# Patient Record
Sex: FEMALE | Race: OTHER | HISPANIC OR LATINO
[De-identification: names, ages, dates, MRNs, and addresses within clinical notes are randomized per-mention and may not be internally consistent; named-entity substitution may affect disease eponyms.]

---

## 2022-03-17 PROBLEM — Z00.00 ENCOUNTER FOR PREVENTIVE HEALTH EXAMINATION: Status: ACTIVE | Noted: 2022-03-17

## 2022-03-18 ENCOUNTER — APPOINTMENT (OUTPATIENT)
Dept: ANTEPARTUM | Facility: CLINIC | Age: 32
End: 2022-03-18
Payer: COMMERCIAL

## 2022-03-18 ENCOUNTER — ASOB RESULT (OUTPATIENT)
Age: 32
End: 2022-03-18

## 2022-03-18 ENCOUNTER — APPOINTMENT (OUTPATIENT)
Dept: MATERNAL FETAL MEDICINE | Facility: CLINIC | Age: 32
End: 2022-03-18
Payer: COMMERCIAL

## 2022-03-18 VITALS
WEIGHT: 116 LBS | HEIGHT: 62 IN | RESPIRATION RATE: 16 BRPM | DIASTOLIC BLOOD PRESSURE: 78 MMHG | SYSTOLIC BLOOD PRESSURE: 134 MMHG | HEART RATE: 96 BPM | OXYGEN SATURATION: 98 % | BODY MASS INDEX: 21.35 KG/M2

## 2022-03-18 DIAGNOSIS — Z87.42 PERSONAL HISTORY OF OTHER DISEASES OF THE FEMALE GENITAL TRACT: ICD-10-CM

## 2022-03-18 DIAGNOSIS — Z3A.13 13 WEEKS GESTATION OF PREGNANCY: ICD-10-CM

## 2022-03-18 PROCEDURE — 36416 COLLJ CAPILLARY BLOOD SPEC: CPT

## 2022-03-18 PROCEDURE — 99205 OFFICE O/P NEW HI 60 MIN: CPT

## 2022-03-18 PROCEDURE — 76813 OB US NUCHAL MEAS 1 GEST: CPT

## 2022-03-18 RX ORDER — ASPIRIN 81 MG/1
81 TABLET, CHEWABLE ORAL DAILY
Qty: 90 | Refills: 3 | Status: ACTIVE | COMMUNITY
Start: 2022-03-18 | End: 1900-01-01

## 2022-03-18 RX ORDER — VITAMIN C, CALCIUM, IRON, VITAMIN D3, VITAMIN E, VITAMIN B1, VITAMIN B2, VITAMIN B3, VITAMIN B6, FOLIC ACID, IODINE, ZINC, COPPER, DOCUSATE SODIUM, DOCOSAHEXAENOIC ACID (DHA) 27-1-50 MG
KIT ORAL
Refills: 0 | Status: ACTIVE | COMMUNITY

## 2022-03-18 RX ORDER — LAMOTRIGINE 25 MG/1
25 TABLET ORAL
Refills: 0 | Status: ACTIVE | COMMUNITY

## 2022-03-18 NOTE — FAMILY HISTORY
[Age 35+ During Pregnancy] : not 35 or over during pregnancy [Reported Family History Of Birth Defects] : no congenital heart defects [Loco-Sachs Carrier] : no Loco-Sachs [Family History] : no mental retardation/autism [Reported Family History Of Genetic Disease] : no history of child defect in child of baby father

## 2022-03-18 NOTE — VITALS
[LMP (date): ___] : LMP was on [unfilled] [GA =___ Weeks] : which calculates to a GA of [unfilled] weeks [GA= ___ Days] : and [unfilled] day(s) [EMILY by LMP (date): ___] : The calculated EMIYL by LMP is [unfilled] [By LMP] : this is the final EMILY

## 2022-03-27 PROBLEM — Z3A.13 13 WEEKS GESTATION OF PREGNANCY: Status: ACTIVE | Noted: 2022-03-18

## 2022-03-27 NOTE — OB HISTORY
[LMP: ___] : LMP: [unfilled] [EMILY: ___] : EMILY: [unfilled] [EGA: ___ wks] : EGA: [unfilled] wks [Spontaneous] : Spontaneous conception [Definite:  ___ (Date)] : the last menstrual period was [unfilled] [Normal Amount/Duration] : was of a normal amount and duration [Regular Cycle Intervals] : periods have been regular [FreeTextEntry1] : History obtained by  using [employer] , ID # [id number]\par \par Patient referred to our office for [facility] to discuss history of epilepsy during pregnancy. Patient states she was diagnosed in 2010 by her PCP after having multiple seizures. Patient states she was put on Lamictal 25 mg BID to control the seizures. Last seizure was 2 years ago. Patient states she does not see a specialist. Patient reports good fetal movement, denies vaginal bleeding, leakage of fluid and cramping/ contractions.  [Spotting Between  Menses] : no spotting between menses

## 2022-03-27 NOTE — HISTORY OF PRESENT ILLNESS
[FreeTextEntry1] : Nicolle Adames is a 31 y.o.  with LMP of 21 and EMILY of 22 who presents at 13w3d for  consultation secondary to pregnancy complicated by seizure disorder.  Her BMI is 21 kg/m2.  She was diagnosed with a seizure disorder in  after experiencing episodes of fainting and generalized seizure disorder. She was started on Lamictal 50 mg BID but is currently taking 25 mg BID.  She had a follow up EEG in [facility] on 22 and no seizure activity was detected (patient provided report for review).  On the day of consultation, Katia feels well and reports no obstetrical or constitutional complaint. History obtained and counseling performed with the assistance of [employer]  #[id number].

## 2022-03-27 NOTE — REVIEW OF SYSTEMS
[Fever] : no fever [Sight Problems] : no sight problems [Chest Pain] : no chest pain [Dyspnea] : no shortness of breath [Abdominal Pain] : no abdominal pain [Vomiting] : no vomiting [Pelvic Pain] : no pelvic pain [Abn Vag Bleeding] : no abnormal vaginal bleeding [Joint Swelling] : no joint swelling [Breast Pain] : no breast pain [Convulsions] : no convulsions [Headache] : no headache [Anxiety] : no anxiety [Depression] : no depression

## 2022-03-27 NOTE — DISCUSSION/SUMMARY
[FreeTextEntry1] : Nicolle reports a history of seizure disorder with no reported seizure activity for the past two years.  She is currently on Lamictal 25 mg BID.  We reviewed the available safety information regarding Lamictal therapy in pregnancy.  An increase in oral clefts was suspected after Lamictal (lamotrigine) exposure during pregnancy based on results from one pregnancy registry but not confirmed in other registries or in a large record-linkage study.  There is no indication to increase folic acid supplementation over the standard recommendation of 400 mcg daily.  The clearance of Lamictal increases during pregnancy and the dose may need to be adjusted with increasing gestational age.  Consider evaluation of total and free plasma drug levels each trimester or at the discretion of neurology.  Recommendations for fetal testing include serial evaluation of interval growth.\par \par We reviewed strategies to decrease triggers for seizure activity, including the importance of adequate rest, sleep and compliance with drug therapy.  This is important not only during the pregnancy, but in the postpartum period.  After delivery, precautions should be taken to protect her infant if Nicolle has a seizure.  For example, having another person present when the she bathes the child should be considered. In addition, the baby should be changed on the floor or an alternative safe position.  All of the antiseizure drugs are measurable in breast milk.  Lamictal is present in human milk at concentrations less than or similar to maternal blood concentrations.  There is no evidence to determine whether this form of antiseizure drug exposure has clinical effects on the . Most experts believe that taking antiseizure drugs does not generally contraindicate breast feeding, as probable benefits outweigh risks. The patient and her partner voiced understanding of the above recommendations and all questions were answered.

## 2022-03-27 NOTE — DATA REVIEWED
[FreeTextEntry1] : 3/18/22 - viable rutledge with CRL measuring consistent with dates.  NT 21. mm.  Fetal profile and NLC appear sonographically normal.

## 2022-04-06 ENCOUNTER — APPOINTMENT (OUTPATIENT)
Dept: ANTEPARTUM | Facility: CLINIC | Age: 32
End: 2022-04-06
Payer: COMMERCIAL

## 2022-04-06 DIAGNOSIS — O35.1XX0 MATERNAL CARE FOR (SUSPECTED) CHROMOSOMAL ABNORMALITY IN FETUS, NOT APPLICABLE OR UNSPECIFIED: ICD-10-CM

## 2022-04-06 PROCEDURE — 36415 COLL VENOUS BLD VENIPUNCTURE: CPT

## 2022-04-08 LAB
1ST TRIMESTER DATA: NORMAL
ADDENDUM DOC: NORMAL
AFP PNL SERPL: NORMAL
AFP SERPL-ACNC: NORMAL
CLINICAL BIOCHEMIST REVIEW: NORMAL
FREE BETA HCG 1ST TRIMESTER: NORMAL
Lab: NORMAL
NASAL BONE: PRESENT
NOTES NTD: NORMAL
NT: NORMAL
PAPP-A SERPL-ACNC: NORMAL
TRISOMY 18/3: NORMAL

## 2022-04-26 LAB
1ST TRIMESTER DATA: NORMAL
2ND TRIMESTER DATA: NORMAL
AFP PNL SERPL: NORMAL
AFP SERPL-ACNC: NORMAL
AFP SERPL-ACNC: NORMAL
B-HCG FREE SERPL-MCNC: NORMAL
CLINICAL BIOCHEMIST REVIEW: NORMAL
FREE BETA HCG 1ST TRIMESTER: NORMAL
INHIBIN A SERPL-MCNC: NORMAL
NASAL BONE: PRESENT
NOTES NTD: NORMAL
NT: NORMAL
PAPP-A SERPL-ACNC: NORMAL
U ESTRIOL SERPL-SCNC: NORMAL

## 2022-05-03 ENCOUNTER — ASOB RESULT (OUTPATIENT)
Age: 32
End: 2022-05-03

## 2022-05-03 ENCOUNTER — APPOINTMENT (OUTPATIENT)
Dept: ANTEPARTUM | Facility: CLINIC | Age: 32
End: 2022-05-03
Payer: COMMERCIAL

## 2022-05-03 PROCEDURE — 76811 OB US DETAILED SNGL FETUS: CPT

## 2022-05-03 PROCEDURE — 76817 TRANSVAGINAL US OBSTETRIC: CPT

## 2022-05-12 ENCOUNTER — APPOINTMENT (OUTPATIENT)
Dept: PEDIATRIC CARDIOLOGY | Facility: CLINIC | Age: 32
End: 2022-05-12
Payer: COMMERCIAL

## 2022-05-12 DIAGNOSIS — O35.5XX0 MATERNAL CARE FOR (SUSPECTED) DAMAGE TO FETUS BY DRUGS, NOT APPLICABLE OR UNSPECIFIED: ICD-10-CM

## 2022-05-12 DIAGNOSIS — O99.351 DISEASES OF THE NERVOUS SYSTEM COMPLICATING PREGNANCY, FIRST TRIMESTER: ICD-10-CM

## 2022-05-12 DIAGNOSIS — G40.909 DISEASES OF THE NERVOUS SYSTEM COMPLICATING PREGNANCY, FIRST TRIMESTER: ICD-10-CM

## 2022-05-12 PROCEDURE — 99203 OFFICE O/P NEW LOW 30 MIN: CPT

## 2022-05-12 PROCEDURE — 76820 UMBILICAL ARTERY ECHO: CPT

## 2022-05-12 PROCEDURE — 76821 MIDDLE CEREBRAL ARTERY ECHO: CPT

## 2022-05-12 PROCEDURE — 93325 DOPPLER ECHO COLOR FLOW MAPG: CPT | Mod: 59

## 2022-05-12 PROCEDURE — 76827 ECHO EXAM OF FETAL HEART: CPT

## 2022-05-12 PROCEDURE — 76825 ECHO EXAM OF FETAL HEART: CPT

## 2022-06-28 ENCOUNTER — APPOINTMENT (OUTPATIENT)
Dept: ANTEPARTUM | Facility: CLINIC | Age: 32
End: 2022-06-28

## 2022-08-10 ENCOUNTER — APPOINTMENT (OUTPATIENT)
Dept: ANTEPARTUM | Facility: CLINIC | Age: 32
End: 2022-08-10

## 2022-08-10 ENCOUNTER — ASOB RESULT (OUTPATIENT)
Age: 32
End: 2022-08-10

## 2022-08-10 ENCOUNTER — APPOINTMENT (OUTPATIENT)
Age: 32
End: 2022-08-10

## 2022-08-10 PROCEDURE — 76820 UMBILICAL ARTERY ECHO: CPT

## 2022-08-10 PROCEDURE — ZZZZZ: CPT

## 2022-08-10 PROCEDURE — 76818 FETAL BIOPHYS PROFILE W/NST: CPT

## 2022-08-10 PROCEDURE — 76821 MIDDLE CEREBRAL ARTERY ECHO: CPT

## 2022-08-10 PROCEDURE — 93976 VASCULAR STUDY: CPT

## 2022-08-10 PROCEDURE — 76816 OB US FOLLOW-UP PER FETUS: CPT

## 2022-08-16 ENCOUNTER — APPOINTMENT (OUTPATIENT)
Dept: ANTEPARTUM | Facility: CLINIC | Age: 32
End: 2022-08-16

## 2022-08-16 ENCOUNTER — ASOB RESULT (OUTPATIENT)
Age: 32
End: 2022-08-16

## 2022-08-16 PROCEDURE — ZZZZZ: CPT

## 2022-08-16 PROCEDURE — 76818 FETAL BIOPHYS PROFILE W/NST: CPT

## 2022-08-16 PROCEDURE — 76820 UMBILICAL ARTERY ECHO: CPT

## 2022-08-23 ENCOUNTER — APPOINTMENT (OUTPATIENT)
Dept: ANTEPARTUM | Facility: CLINIC | Age: 32
End: 2022-08-23

## 2022-08-23 ENCOUNTER — ASOB RESULT (OUTPATIENT)
Age: 32
End: 2022-08-23

## 2022-08-23 ENCOUNTER — NON-APPOINTMENT (OUTPATIENT)
Age: 32
End: 2022-08-23

## 2022-08-23 PROCEDURE — 76818 FETAL BIOPHYS PROFILE W/NST: CPT

## 2022-08-23 PROCEDURE — ZZZZZ: CPT

## 2022-08-26 ENCOUNTER — ASOB RESULT (OUTPATIENT)
Age: 32
End: 2022-08-26

## 2022-08-26 ENCOUNTER — APPOINTMENT (OUTPATIENT)
Dept: ANTEPARTUM | Facility: CLINIC | Age: 32
End: 2022-08-26

## 2022-08-26 PROCEDURE — ZZZZZ: CPT

## 2022-08-26 PROCEDURE — 76821 MIDDLE CEREBRAL ARTERY ECHO: CPT

## 2022-08-26 PROCEDURE — 76816 OB US FOLLOW-UP PER FETUS: CPT

## 2022-08-26 PROCEDURE — 93976 VASCULAR STUDY: CPT

## 2022-08-26 PROCEDURE — 76820 UMBILICAL ARTERY ECHO: CPT

## 2022-08-26 PROCEDURE — 76818 FETAL BIOPHYS PROFILE W/NST: CPT

## 2022-08-30 ENCOUNTER — APPOINTMENT (OUTPATIENT)
Dept: ANTEPARTUM | Facility: CLINIC | Age: 32
End: 2022-08-30

## 2022-08-30 RX ORDER — OXYTOCIN 10 UNIT/ML
333.33 VIAL (ML) INJECTION
Qty: 20 | Refills: 0 | Status: COMPLETED | OUTPATIENT
Start: 2022-08-31 | End: 2022-08-31

## 2022-08-30 RX ORDER — LAMOTRIGINE 25 MG/1
25 TABLET, ORALLY DISINTEGRATING ORAL
Refills: 0 | Status: DISCONTINUED | OUTPATIENT
Start: 2022-08-31 | End: 2022-09-03

## 2022-08-30 RX ORDER — SODIUM CHLORIDE 9 MG/ML
1000 INJECTION, SOLUTION INTRAVENOUS
Refills: 0 | Status: DISCONTINUED | OUTPATIENT
Start: 2022-08-31 | End: 2022-09-01

## 2022-08-30 RX ORDER — CHLORHEXIDINE GLUCONATE 213 G/1000ML
1 SOLUTION TOPICAL ONCE
Refills: 0 | Status: DISCONTINUED | OUTPATIENT
Start: 2022-08-31 | End: 2022-09-01

## 2022-08-30 NOTE — OB PROVIDER H&P - NSHPPHYSICALEXAM_GEN_ALL_CORE
Gen: NAD, well-appearing, AAOx3   Abd: Soft, gravid  Ext: non-tender, non-edematous  SSE:   SVE:  1/40/-3  Bedside sono: vertex, posterior placenta  FHT: baseline 130, moderate variability, +accels, -decels Gen: NAD, well-appearing, AAOx3   Abd: Soft, gravid  Ext: non-tender, non-edematous  SVE:  1/40/-3  Bedside sono: vertex, posterior placenta  FHT: baseline 130, moderate variability, +accels, -decels

## 2022-08-30 NOTE — OB PROVIDER H&P - PRO RUBELLA INFANT
Detail Level: Detailed Quality 137: Melanoma: Continuity Of Care - Recall System: Patient information entered into a recall system that includes: target date for the next exam specified AND a process to follow up with patients regarding missed or unscheduled appointments immune

## 2022-08-30 NOTE — OB PROVIDER H&P - ASSESSMENT
David Nicolle Adames is a 32 year old  at 37w1d by LMP who presents to L&D for IOL secondary to FGR EFW 2%ile, AC <1%ile, elevated left uterine PI with notching.   -Admit to L&D  -Consent  -Admission labs  -IV fluids  -Labor: Intact/*ROM. Latent/Active labor. Teresa *.   -Fetus: Cat I tracing. Continuous toco and fetal monitoring.   -GBS: Unknown. Culture ordered  -Analgesia:    David pugaZacarias Nicolle is a 32 year old  at 37w1d by LMP who presents to L&D for IOL secondary to FGR EFW 2%ile, AC <1%ile, elevated left uterine PI with notching.   -Admit to L&D  -Consent  -Admission labs  -IV fluids  -Labor: Intact. Teresa ~q8mins.  -Fetus: Cat I tracing. Continuous toco and fetal monitoring.   -GBS: Unknown. Culture ordered  -Analgesia   Nicolle Larkin is a 32 year old  at 37w1d by LMP who is admitted to L&D for IOL secondary to FGR EFW 2%ile, AC <1%ile, elevated left uterine PI with notching.   -Admit to L&D  -Consent  -Admission labs  -IV fluids  -Labor: Intact. Teresa ~q8mins.  -Fetus: Cat I tracing. Continuous toco and fetal monitoring.   -GBS: Neg  -Analgesia: prn     Will discuss with Dr. Gutierrez

## 2022-08-30 NOTE — OB PROVIDER H&P - PRETERM DELIVERIES, OB PROFILE
Pt refusing to be seen and wheeled herself out of triage during report from EMS.  
Writer attempted to ask patient to stay and being seen, left out triage entrance and does not want to be seen  
0

## 2022-08-30 NOTE — OB PROVIDER H&P - HISTORY OF PRESENT ILLNESS
David Zacarias, Nicolle is a 32 year old  at 37w1d by LMP who presents to L&D for IOL secondary to FGR EFW 2%ile, AC <1%ile, elevated left uterine PI with notching.         EMILY: 22   LMP: 21      Pregnancy course:   FGR EFW 2%ile, AC <1%ile with abnormal Dopplers: elevated left uterine PI with notching   Epilepsy on Lamictal   Marginal cord insertion      Obhx: None   Gynhx: Hx of ovarian cyst. Denies abnormal Paps, STIs   Pmhx: Epilepsy diagnosed in  with last seizure 2 years ago    Pshx: Denies   Meds: Lamicatl 25mg BID, iron, ASA, PNV   Allergies: Acetaminophen   Social Hx: Denies alcohol, tobacco, illicit drug use during pregnancy      BMI: 25.05   Ultrasound: vtx, post   EFW : 2112, 2%ile  David Zacarias, Nicolle is a 32 year old  at 37w1d by LMP who presents to L&D for IOL secondary to FGR EFW 2%ile, AC <1%ile, elevated left uterine PI with notching.         EMILY: 22   LMP: 21      Pregnancy course:   FGR EFW 2%ile, AC <1%ile with abnormal Dopplers: elevated left uterine PI with notching   Epilepsy on Lamictal   Marginal cord insertion      Obhx: None   Gynhx: Hx of ovarian cyst. Denies abnormal Paps, STIs   Pmhx: Epilepsy diagnosed in  with last seizure 2 years ago    Pshx: Denies   Meds: Lamicatl 25mg BID, iron, ASA, PNV   Allergies: Acetaminophen (hives)  Social Hx: Denies alcohol, tobacco, illicit drug use during pregnancy      BMI: 25.05   Ultrasound: vtx, post   EFW : 2112, 2%ile  David Zacarias, Nicolle is a 32 year old  at 37w1d by LMP who presents to L&D for IOL secondary to FGR EFW 2%ile, AC <1%ile, elevated left uterine PI with notching.         EMILY: 22   LMP: 21      Pregnancy course:   FGR EFW 2%ile, AC <1%ile with abnormal Dopplers: elevated left uterine PI with notching   Epilepsy on Lamictal   Marginal cord insertion      Obhx: None   Gynhx: Hx of ovarian cyst. Denies abnormal Paps, STIs   Pmhx: Epilepsy diagnosed in  with last seizure 2 years ago    Pshx: Denies   Meds: Lamicatl 25mg BID, iron, ASA, PNV   Allergies: Acetaminophen (hives)  Social Hx: Denies alcohol, tobacco, illicit drug use during pregnancy      BMI: 25.05   Ultrasound: vtx, post   EFW : 2112, 2%ile      Nicolle Larkin is a 32 year old  at 37w1d by LMP who presents to L&D for IOL secondary to FGR EFW 2%ile, AC <1%ile, elevated left uterine PI with notching. She denies contractions, leakage of fluid, and vaginal bleeding. She states fetal movement. No other complaints at this time.       EMILY: 22   LMP: 21      Pregnancy course:   FGR EFW 2%ile, AC <1%ile with abnormal Dopplers: elevated left uterine PI with notching   Epilepsy on Lamictal   Marginal cord insertion      Obhx: None   Gynhx: Hx of ovarian cyst. Denies abnormal Paps, STIs   Pmhx: Epilepsy diagnosed in  with last seizure 2 years ago    Pshx: Denies   Meds: Lamicatl 25mg BID, iron, ASA, PNV   Allergies: Acetaminophen (hives)  Social Hx: Denies alcohol, tobacco, illicit drug use during pregnancy      BMI: 25.05   Ultrasound: vtx, post   EFW : 2112, 2%ile

## 2022-08-31 ENCOUNTER — INPATIENT (INPATIENT)
Facility: HOSPITAL | Age: 32
LOS: 2 days | Discharge: HOME | End: 2022-09-03
Attending: SPECIALIST | Admitting: SPECIALIST
Payer: COMMERCIAL

## 2022-08-31 VITALS — RESPIRATION RATE: 17 BRPM | TEMPERATURE: 98 F

## 2022-08-31 LAB
ANISOCYTOSIS BLD QL: SLIGHT — SIGNIFICANT CHANGE UP
BASOPHILS # BLD AUTO: 0.1 K/UL — SIGNIFICANT CHANGE UP (ref 0–0.2)
BASOPHILS NFR BLD AUTO: 0.9 % — SIGNIFICANT CHANGE UP (ref 0–2)
BLD GP AB SCN SERPL QL: SIGNIFICANT CHANGE UP
EOSINOPHIL # BLD AUTO: 0 K/UL — SIGNIFICANT CHANGE UP (ref 0–0.5)
EOSINOPHIL NFR BLD AUTO: 0 % — SIGNIFICANT CHANGE UP (ref 0–6)
GIANT PLATELETS BLD QL SMEAR: PRESENT — SIGNIFICANT CHANGE UP
HCT VFR BLD CALC: 41.9 % — SIGNIFICANT CHANGE UP (ref 34.5–45)
HGB BLD-MCNC: 14.1 G/DL — SIGNIFICANT CHANGE UP (ref 11.5–15.5)
LYMPHOCYTES # BLD AUTO: 17.6 % — SIGNIFICANT CHANGE UP (ref 13–44)
LYMPHOCYTES # BLD AUTO: 2 K/UL — SIGNIFICANT CHANGE UP (ref 1–3.3)
MANUAL SMEAR VERIFICATION: SIGNIFICANT CHANGE UP
MCHC RBC-ENTMCNC: 31.5 PG — SIGNIFICANT CHANGE UP (ref 27–34)
MCHC RBC-ENTMCNC: 33.7 GM/DL — SIGNIFICANT CHANGE UP (ref 32–36)
MCV RBC AUTO: 93.5 FL — SIGNIFICANT CHANGE UP (ref 80–100)
MONOCYTES # BLD AUTO: 0.69 K/UL — SIGNIFICANT CHANGE UP (ref 0–0.9)
MONOCYTES NFR BLD AUTO: 6.1 % — SIGNIFICANT CHANGE UP (ref 2–14)
MYELOCYTES NFR BLD: 2.6 % — HIGH (ref 0–0)
NEUTROPHILS # BLD AUTO: 8.28 K/UL — HIGH (ref 1.8–7.4)
NEUTROPHILS NFR BLD AUTO: 72.8 % — SIGNIFICANT CHANGE UP (ref 43–77)
PLAT MORPH BLD: NORMAL — SIGNIFICANT CHANGE UP
PLATELET # BLD AUTO: 155 K/UL — SIGNIFICANT CHANGE UP (ref 150–400)
POLYCHROMASIA BLD QL SMEAR: SIGNIFICANT CHANGE UP
RBC # BLD: 4.48 M/UL — SIGNIFICANT CHANGE UP (ref 3.8–5.2)
RBC # FLD: 13.6 % — SIGNIFICANT CHANGE UP (ref 10.3–14.5)
RBC BLD AUTO: ABNORMAL
SARS-COV-2 RNA SPEC QL NAA+PROBE: SIGNIFICANT CHANGE UP
WBC # BLD: 11.37 K/UL — HIGH (ref 3.8–10.5)
WBC # FLD AUTO: 11.37 K/UL — HIGH (ref 3.8–10.5)

## 2022-08-31 RX ORDER — CITRIC ACID/SODIUM CITRATE 300-500 MG
30 SOLUTION, ORAL ORAL ONCE
Refills: 0 | Status: COMPLETED | OUTPATIENT
Start: 2022-08-31 | End: 2022-09-01

## 2022-08-31 RX ORDER — SODIUM CHLORIDE 9 MG/ML
1000 INJECTION, SOLUTION INTRAVENOUS
Refills: 0 | Status: DISCONTINUED | OUTPATIENT
Start: 2022-08-31 | End: 2022-09-03

## 2022-08-31 RX ADMIN — LAMOTRIGINE 25 MILLIGRAM(S): 25 TABLET, ORALLY DISINTEGRATING ORAL at 17:59

## 2022-08-31 NOTE — OB RN PATIENT PROFILE - PATIENT REPRESENTATIVE: ( YOU CAN CHOOSE ANY PERSON THAT CAN ASSIST YOU WITH YOUR HEALTH CARE PREFERENCES, DOES NOT HAVE TO BE A SPOUSE, IMMEDIATE FAMILY OR SIGNIFICANT OTHER/PARTNER)
Declines no loss of consciousness, no gait abnormality, no headache, no sensory deficits, and no weakness.

## 2022-08-31 NOTE — OB RN PATIENT PROFILE - FALL HARM RISK - UNIVERSAL INTERVENTIONS
Bed in lowest position, wheels locked, appropriate side rails in place/Call bell, personal items and telephone in reach/Instruct patient to call for assistance before getting out of bed or chair/Non-slip footwear when patient is out of bed/[place] to call system/Physically safe environment - no spills, clutter or unnecessary equipment/Purposeful Proactive Rounding/Room/bathroom lighting operational, light cord in reach

## 2022-09-01 ENCOUNTER — RESULT REVIEW (OUTPATIENT)
Age: 32
End: 2022-09-01

## 2022-09-01 LAB
COVID-19 SPIKE DOMAIN AB INTERP: POSITIVE
COVID-19 SPIKE DOMAIN ANTIBODY RESULT: >250 U/ML — HIGH
SARS-COV-2 IGG+IGM SERPL QL IA: >250 U/ML — HIGH
SARS-COV-2 IGG+IGM SERPL QL IA: POSITIVE
T PALLIDUM AB TITR SER: NEGATIVE — SIGNIFICANT CHANGE UP

## 2022-09-01 PROCEDURE — 88307 TISSUE EXAM BY PATHOLOGIST: CPT | Mod: 26

## 2022-09-01 RX ORDER — IBUPROFEN 200 MG
600 TABLET ORAL EVERY 6 HOURS
Refills: 0 | Status: COMPLETED | OUTPATIENT
Start: 2022-09-01 | End: 2023-07-31

## 2022-09-01 RX ORDER — KETOROLAC TROMETHAMINE 30 MG/ML
30 SYRINGE (ML) INJECTION ONCE
Refills: 0 | Status: DISCONTINUED | OUTPATIENT
Start: 2022-09-01 | End: 2022-09-01

## 2022-09-01 RX ORDER — MAGNESIUM HYDROXIDE 400 MG/1
30 TABLET, CHEWABLE ORAL
Refills: 0 | Status: DISCONTINUED | OUTPATIENT
Start: 2022-09-01 | End: 2022-09-03

## 2022-09-01 RX ORDER — DIPHENHYDRAMINE HCL 50 MG
25 CAPSULE ORAL EVERY 6 HOURS
Refills: 0 | Status: DISCONTINUED | OUTPATIENT
Start: 2022-09-01 | End: 2022-09-03

## 2022-09-01 RX ORDER — OXYTOCIN 10 UNIT/ML
VIAL (ML) INJECTION
Qty: 30 | Refills: 0 | Status: DISCONTINUED | OUTPATIENT
Start: 2022-09-01 | End: 2022-09-01

## 2022-09-01 RX ORDER — PRAMOXINE HYDROCHLORIDE 150 MG/15G
1 AEROSOL, FOAM RECTAL EVERY 4 HOURS
Refills: 0 | Status: DISCONTINUED | OUTPATIENT
Start: 2022-09-01 | End: 2022-09-03

## 2022-09-01 RX ORDER — SIMETHICONE 80 MG/1
80 TABLET, CHEWABLE ORAL EVERY 4 HOURS
Refills: 0 | Status: DISCONTINUED | OUTPATIENT
Start: 2022-09-01 | End: 2022-09-03

## 2022-09-01 RX ORDER — LANOLIN
1 OINTMENT (GRAM) TOPICAL EVERY 6 HOURS
Refills: 0 | Status: DISCONTINUED | OUTPATIENT
Start: 2022-09-01 | End: 2022-09-03

## 2022-09-01 RX ORDER — OXYTOCIN 10 UNIT/ML
333.33 VIAL (ML) INJECTION
Qty: 20 | Refills: 0 | Status: DISCONTINUED | OUTPATIENT
Start: 2022-09-01 | End: 2022-09-03

## 2022-09-01 RX ORDER — AER TRAVELER 0.5 G/1
1 SOLUTION RECTAL; TOPICAL EVERY 4 HOURS
Refills: 0 | Status: DISCONTINUED | OUTPATIENT
Start: 2022-09-01 | End: 2022-09-03

## 2022-09-01 RX ORDER — DIBUCAINE 1 %
1 OINTMENT (GRAM) RECTAL EVERY 6 HOURS
Refills: 0 | Status: DISCONTINUED | OUTPATIENT
Start: 2022-09-01 | End: 2022-09-03

## 2022-09-01 RX ORDER — SODIUM CHLORIDE 9 MG/ML
1000 INJECTION, SOLUTION INTRAVENOUS ONCE
Refills: 0 | Status: DISCONTINUED | OUTPATIENT
Start: 2022-09-01 | End: 2022-09-03

## 2022-09-01 RX ORDER — OXYCODONE HYDROCHLORIDE 5 MG/1
5 TABLET ORAL ONCE
Refills: 0 | Status: DISCONTINUED | OUTPATIENT
Start: 2022-09-01 | End: 2022-09-03

## 2022-09-01 RX ORDER — HYDROCORTISONE 1 %
1 OINTMENT (GRAM) TOPICAL EVERY 6 HOURS
Refills: 0 | Status: DISCONTINUED | OUTPATIENT
Start: 2022-09-01 | End: 2022-09-03

## 2022-09-01 RX ORDER — TETANUS TOXOID, REDUCED DIPHTHERIA TOXOID AND ACELLULAR PERTUSSIS VACCINE, ADSORBED 5; 2.5; 8; 8; 2.5 [IU]/.5ML; [IU]/.5ML; UG/.5ML; UG/.5ML; UG/.5ML
0.5 SUSPENSION INTRAMUSCULAR ONCE
Refills: 0 | Status: DISCONTINUED | OUTPATIENT
Start: 2022-09-01 | End: 2022-09-03

## 2022-09-01 RX ORDER — SODIUM CHLORIDE 9 MG/ML
3 INJECTION INTRAMUSCULAR; INTRAVENOUS; SUBCUTANEOUS EVERY 8 HOURS
Refills: 0 | Status: DISCONTINUED | OUTPATIENT
Start: 2022-09-01 | End: 2022-09-03

## 2022-09-01 RX ORDER — IBUPROFEN 200 MG
600 TABLET ORAL EVERY 6 HOURS
Refills: 0 | Status: DISCONTINUED | OUTPATIENT
Start: 2022-09-01 | End: 2022-09-03

## 2022-09-01 RX ORDER — OXYCODONE HYDROCHLORIDE 5 MG/1
5 TABLET ORAL
Refills: 0 | Status: DISCONTINUED | OUTPATIENT
Start: 2022-09-01 | End: 2022-09-03

## 2022-09-01 RX ORDER — BENZOCAINE 10 %
1 GEL (GRAM) MUCOUS MEMBRANE EVERY 6 HOURS
Refills: 0 | Status: DISCONTINUED | OUTPATIENT
Start: 2022-09-01 | End: 2022-09-03

## 2022-09-01 RX ADMIN — Medication 30 MILLIGRAM(S): at 19:30

## 2022-09-01 RX ADMIN — Medication 600 MILLIGRAM(S): at 22:57

## 2022-09-01 RX ADMIN — Medication 1000 MILLIUNIT(S)/MIN: at 18:27

## 2022-09-01 RX ADMIN — LAMOTRIGINE 25 MILLIGRAM(S): 25 TABLET, ORALLY DISINTEGRATING ORAL at 18:22

## 2022-09-01 RX ADMIN — LAMOTRIGINE 25 MILLIGRAM(S): 25 TABLET, ORALLY DISINTEGRATING ORAL at 06:17

## 2022-09-01 RX ADMIN — Medication 30 MILLILITER(S): at 10:21

## 2022-09-01 RX ADMIN — Medication 2 MILLIUNIT(S)/MIN: at 10:39

## 2022-09-01 NOTE — OB PROVIDER LABOR PROGRESS NOTE - NS_SUBJECTIVE/OBJECTIVE_OBGYN_ALL_OB_FT
Patient feels well
CHART NOTE
TRACING NOTE     Vital Signs Last 24 Hrs  T(C): 36.6 (31 Aug 2022 12:22), Max: 36.6 (31 Aug 2022 11:52)  T(F): 97.9 (31 Aug 2022 12:22), Max: 97.9 (31 Aug 2022 12:22)  HR: 89 (31 Aug 2022 16:44) (76 - 89)  BP: 120/71 (31 Aug 2022 16:44) (120/71 - 122/72)  RR: 17 (31 Aug 2022 12:22) (17 - 17)
Patient was re-examined at bedside. Reports feeling comfortable.

## 2022-09-01 NOTE — OB PROVIDER LABOR PROGRESS NOTE - ASSESSMENT
-VSS  -Patient was AROM with clear fluids  -Will transition patient to PItocin  -Will continue to re-assess patient as needed
Continue current management 
Continue current management. 
Cat  I tracing   Will continue to monitor

## 2022-09-01 NOTE — OB PROVIDER LABOR PROGRESS NOTE - NS_OBIHIFHRDETAILS_OBGYN_ALL_OB_FT
baseline 120s, moderate variability, +accels, -decels
baseline 130s, moderate variability, +accels, -decels
baseline FHR 130s, moderate variability, +accels, -decels
baseline 120, moderate variability, +accels, -decels

## 2022-09-01 NOTE — OB PROVIDER DELIVERY SUMMARY - NSPROVIDERDELIVERYNOTE_OBGYN_ALL_OB_FT
Vaginal Delivery Summary    Procedure: Normal spontaneous vaginal delivery   Findings: Viable female infant delivered in cephalic presentation at 1750, placenta delivered at 1752.  Apgar scores 4/8   Weight : 2320  Laceration(s): 1st degree perineal  Repair: 3.0 vicryl   EBL: 138  Complications: Nuchal x1    Procedure:   Patient was an induction of labor for FGR with EFW in the 2nd percentile and AC in the 1st percentile. Patient felt rectal pressure and was found to be fully dilated, +1 station. She pushed effectively for 30 minutes. She delivered a viable female infant. The infant's head was delivered through a loose nuchal cord X 1. Cord was clamped and cut and the baby was immediately take over to the warmer to the awaiting pediatrician.  Placenta delivered intact and pitocin was started at the delivery of the baby. Perineum and vagina were inspected, a 1st degree laceration present and repaired with 3.0 vicryl. Adequate hemostasis was obtained.    Dr. Gavin present at delivery

## 2022-09-01 NOTE — OB PROVIDER DELIVERY SUMMARY - NSSELHIDDEN_OBGYN_ALL_OB_FT
[NS_DeliveryAttending1_OBGYN_ALL_OB_FT:[id number]],[NS_DeliveryAssist1_OBGYN_ALL_OB_FT:[id number]=]

## 2022-09-01 NOTE — CHART NOTE - NSCHARTNOTEFT_GEN_A_CORE
09/01/22 9:00 AM   pelvic exam: cx 1-2 cm 80% 0 station AROM clear and adequate.  Plan: start pitocin

## 2022-09-01 NOTE — OB RN DELIVERY SUMMARY - NSSELHIDDEN_OBGYN_ALL_OB_FT
[NS_DeliveryAssist1_OBGYN_ALL_OB_FT:[id number]],[NS_DeliveryRN_OBGYN_ALL_OB_FT:[id number]=]

## 2022-09-01 NOTE — OB RN DELIVERY SUMMARY - NS_SEPSISRSKCALC_OBGYN_ALL_OB_FT
EOS calculated successfully. EOS Risk Factor: 0.5/1000 live births ([facility] national incidence); GA=37w2d; Temp=98.24; ROM=9.217; GBS='Negative'; Antibiotics='No antibiotics or any antibiotics < 2 hrs prior to birth'

## 2022-09-02 ENCOUNTER — APPOINTMENT (OUTPATIENT)
Dept: ANTEPARTUM | Facility: CLINIC | Age: 32
End: 2022-09-02

## 2022-09-02 LAB
HCT VFR BLD CALC: 33.8 % — LOW (ref 34.5–45)
HGB BLD-MCNC: 11 G/DL — LOW (ref 11.5–15.5)

## 2022-09-02 RX ADMIN — Medication 600 MILLIGRAM(S): at 17:39

## 2022-09-02 RX ADMIN — Medication 600 MILLIGRAM(S): at 05:38

## 2022-09-02 RX ADMIN — LAMOTRIGINE 25 MILLIGRAM(S): 25 TABLET, ORALLY DISINTEGRATING ORAL at 05:38

## 2022-09-02 RX ADMIN — Medication 600 MILLIGRAM(S): at 23:52

## 2022-09-02 RX ADMIN — Medication 600 MILLIGRAM(S): at 11:27

## 2022-09-02 RX ADMIN — SODIUM CHLORIDE 3 MILLILITER(S): 9 INJECTION INTRAMUSCULAR; INTRAVENOUS; SUBCUTANEOUS at 17:39

## 2022-09-02 RX ADMIN — LAMOTRIGINE 25 MILLIGRAM(S): 25 TABLET, ORALLY DISINTEGRATING ORAL at 17:41

## 2022-09-02 RX ADMIN — Medication 1 TABLET(S): at 11:27

## 2022-09-02 RX ADMIN — Medication 600 MILLIGRAM(S): at 17:41

## 2022-09-02 NOTE — OB NEONATOLOGY/PEDIATRICIAN DELIVERY SUMMARY - NSPEDSNEONOTESA_OBGYN_ALL_OB_FT
Dr. Kaye requested me to attend  at 37.2 weeks due to NRFHT and FGR. The mother is 31 y/o, , GBS, HIV, HBsAg, RPR are NR, RI.  L & D: Nuchal cord x1, limp, stimulated, suctioned, PPV X 30 sec, the CPAP X 2 min with improvement, APGAR 4 & 8, BW: 5-2 lb  Asst: Term, border line SGA, IUGR, LBW, BG,   Plan: observe in transition, F/U a/c, if stable admit to NBN.

## 2022-09-03 ENCOUNTER — TRANSCRIPTION ENCOUNTER (OUTPATIENT)
Age: 32
End: 2022-09-03

## 2022-09-03 VITALS
SYSTOLIC BLOOD PRESSURE: 95 MMHG | HEART RATE: 84 BPM | TEMPERATURE: 98 F | DIASTOLIC BLOOD PRESSURE: 62 MMHG | OXYGEN SATURATION: 98 % | RESPIRATION RATE: 16 BRPM

## 2022-09-03 PROCEDURE — 86900 BLOOD TYPING SEROLOGIC ABO: CPT

## 2022-09-03 PROCEDURE — 88307 TISSUE EXAM BY PATHOLOGIST: CPT

## 2022-09-03 PROCEDURE — 85014 HEMATOCRIT: CPT

## 2022-09-03 PROCEDURE — 86780 TREPONEMA PALLIDUM: CPT

## 2022-09-03 PROCEDURE — 85025 COMPLETE CBC W/AUTO DIFF WBC: CPT

## 2022-09-03 PROCEDURE — 86769 SARS-COV-2 COVID-19 ANTIBODY: CPT

## 2022-09-03 PROCEDURE — 36415 COLL VENOUS BLD VENIPUNCTURE: CPT

## 2022-09-03 PROCEDURE — U0003: CPT

## 2022-09-03 PROCEDURE — 86850 RBC ANTIBODY SCREEN: CPT

## 2022-09-03 PROCEDURE — U0005: CPT

## 2022-09-03 PROCEDURE — 85018 HEMOGLOBIN: CPT

## 2022-09-03 PROCEDURE — 86901 BLOOD TYPING SEROLOGIC RH(D): CPT

## 2022-09-03 RX ORDER — POLYETHYLENE GLYCOL 3350 17 G/17G
17 POWDER, FOR SOLUTION ORAL
Qty: 119 | Refills: 0
Start: 2022-09-03 | End: 2022-09-09

## 2022-09-03 RX ORDER — ACETAMINOPHEN 500 MG
3 TABLET ORAL
Qty: 48 | Refills: 0
Start: 2022-09-03 | End: 2022-09-06

## 2022-09-03 RX ORDER — IBUPROFEN 200 MG
1 TABLET ORAL
Qty: 28 | Refills: 0
Start: 2022-09-03 | End: 2022-09-09

## 2022-09-03 RX ADMIN — Medication 600 MILLIGRAM(S): at 05:27

## 2022-09-03 RX ADMIN — MAGNESIUM HYDROXIDE 30 MILLILITER(S): 400 TABLET, CHEWABLE ORAL at 08:13

## 2022-09-03 RX ADMIN — LAMOTRIGINE 25 MILLIGRAM(S): 25 TABLET, ORALLY DISINTEGRATING ORAL at 05:27

## 2022-09-03 NOTE — PROGRESS NOTE ADULT - ASSESSMENT
A/P:  KALE CRYSTAL is a 32y  now PPD#2 s/p spontaneous vaginal delivery at 37w1d gestation 2/2 FGR EFW 2nd percentile, uncomplicated delivery.    -Vital signs stable  -Hgb: 14.1-> 11  -Voiding, tolerating PO  -Advance care as tolerated   -Continue routine postpartum care and education  -Healthy female infant  -Dispo: Anticipate discharge to home pending attending approval.
A/P:  KALE CRYSTAL is a 32y  now PPD#1 s/p spontaneous vaginal delivery at 37w1d gestation, IOL 2/2 fetal growth restriction, uncomplicated delivery.  -Vital signs stable  -Hgb: 14.1 -> AM labs pending   -Voiding, tolerating PO  -Advance care as tolerated   -Continue routine postpartum care and education  -Healthy female infant  -Dispo: Patient to be discharged when meeting all postpartum milestones and pending attending approval.

## 2022-09-03 NOTE — DISCHARGE NOTE OB - PLAN OF CARE
- Please call your provider to schedule a postpartum visit in 4-6 weeks. Contact information provided below.  - Prescriptions have been sent to pharmacy. Please take medications as directed.   - Patient is to continue with regular diet and activity as tolerated, nothing per vagina for 6 weeks, and exclusive breast feeding for the first 6 months is recommended.   - If you have additional concerns, or if you experience fevers > 100.4 F or heavy vaginal bleeding that is not decreasing, please inform your provider.

## 2022-09-03 NOTE — DISCHARGE NOTE OB - REASON FOR ADMISSION
I got the pt ED discharge paperwork, I called to check up on the pt and help setup a ED follow up.  I talked to the pt mother, the pt seems to be doing fine now.  It seems like the pt mother is not really sure if the pt fully recovered and had some concerns.  I told her that we should schedule an appointment for the pt to follow up since, she seems to have some concerns.  The pt was scheduled to come in tomorrow at 10:00am to see Valarie Webb.   
normal spontaneous vaginal delivery

## 2022-09-03 NOTE — DISCHARGE NOTE OB - HOSPITAL COURSE
Patient is a 33yo  delivered @ 37.1wga via spontaneous vaginal delivery after IOL for FGR. Delivery was uncomplicated. She was transferred from L&D to postpartum unit without complications during her stay. Upon discharge she is voiding, tolerating PO, ambulating, lochia is decreasing, labs and vitals are stable, and pain is well controlled.

## 2022-09-03 NOTE — DISCHARGE NOTE OB - NS MD DC FALL RISK RISK
For information on Fall & Injury Prevention, visit: [email or web address] OR  [email or web address] OR  https://www.cdc.gov/steadi/patient.html

## 2022-09-03 NOTE — PROGRESS NOTE ADULT - SUBJECTIVE AND OBJECTIVE BOX
S: Patient doing well. Minimal lochia. No complaints.     O: Vital Signs Last 24 Hrs  T(C): 36.7 (03 Sep 2022 05:39), Max: 36.9 (02 Sep 2022 15:22)  T(F): 98 (03 Sep 2022 05:39), Max: 98.4 (02 Sep 2022 15:22)  HR: 84 (03 Sep 2022 05:39) (74 - 84)  BP: 95/62 (03 Sep 2022 05:39) (94/67 - 95/62)  BP(mean): --  RR: 16 (03 Sep 2022 05:39) (16 - 18)  SpO2: 98% (03 Sep 2022 05:39) (98% - 98%)        Gen: NAD  Breast : breast feeding  Abd: soft, NT, ND, fundus firm below umbilicus.  Lochia mild, voiding well.  Ext: no tenderness    Labs:                        11.0   x     )-----------( x        ( 02 Sep 2022 05:34 )             33.8            PP # 2 s/p     Doing well.  Discharge home today.  Nothing in vagina and no heavy lifting for 6 weeks.   Follow up 6 weeks for post partum visit.  Call office for any fevers, chills, heavy vaginal bleeding, symptoms of depression, or any other concerning symptoms.  Continue motrin 600 mg every 6 hours.              
S: Patient doing well. Minimal lochia. No complaints.    O: Vital Signs Last 24 Hrs  T(C): 36.7 (02 Sep 2022 04:55), Max: 37.1 (01 Sep 2022 21:00)  T(F): 98.1 (02 Sep 2022 04:55), Max: 98.7 (01 Sep 2022 21:00)  HR: 75 (02 Sep 2022 04:55) (64 - 154)  BP: 98/63 (02 Sep 2022 04:55) (93/58 - 136/82)  BP(mean): --  RR: 18 (02 Sep 2022 04:55) (18 - 18)  SpO2: 98% (02 Sep 2022 04:55) (83% - 100%)    Parameters below as of 02 Sep 2022 04:55  Patient On (Oxygen Delivery Method): room air        Gen: NAD  Abd: soft, NT, ND, fundus firm below umbilicus.  Lochia mild , voiding well.  Ext: no tenderness    Labs:                        11.0   x     )-----------( x        ( 02 Sep 2022 05:34 )             33.8          A/P PP # 1  Doing well.  Routine post partum care.  Discharge home in AM.      
KALE CRYSTAL is a 32y  now PPD#1 s/p spontaneous vaginal delivery at 37w1d gestation, IOL 2/2 fetal growth restriction, uncomplicated delivery.    S:    No acute events overnight.   The patient has no complaints.  Pain controlled with current treatment regimen.   She is ambulating without difficulty and tolerating PO.   + flatus/-BM/+ voiding   She endorses appropriate lochia, which is decreasing.   She is breastfeeding without difficulty.   She denies fevers, chills, nausea and vomiting.   She denies lightheadedness, dizziness, palpitations, chest pain and SOB.     O:    T(C): 36.7 (22 @ 04:55), Max: 37.1 (22 @ 21:00)  HR: 75 (22 @ 04:55) (64 - 154)  BP: 98/63 (22 @ 04:55) (93/58 - 136/82)  RR: 18 (22 @ 04:55) (18 - 18)  SpO2: 98% (22 @ 04:55) (83% - 100%)    Gen: NAD, AOx3, resting comfortably on room air  Abdomen:  Soft, non-tender, non-distended  Uterus:  Fundus firm below umbilicus  VE:  Expected lochia  Ext:  b/l LE non-tender                           14.1   11.37 )-----------( 155      ( 31 Aug 2022 13:48 )             41.9               
KALE CRYSTAL is a 32y  now PPD#2 s/p spontaneous vaginal delivery at 37w1d gestation 2/2 FGR EFW 2nd percentile, uncomplicated delivery.    S:    No acute events overnight.   The patient has no complaints.  Pain controlled with current treatment regimen.   She is ambulating without difficulty and tolerating PO.   + flatus/-BM/+ voiding   She endorses appropriate lochia, which is decreasing.   She is breastfeeding without difficulty.   She denies fevers, chills, nausea and vomiting.   She denies lightheadedness, dizziness, palpitations, chest pain and SOB.     O:    T(C): 36.7 (22 @ 05:39), Max: 36.9 (22 @ 15:22)  HR: 84 (22 @ 05:39) (74 - 84)  BP: 95/62 (22 @ 05:39) (94/67 - 95/62)  RR: 16 (22 @ 05:39) (16 - 18)  SpO2: 98% (22 @ 05:39) (98% - 98%)    Gen: NAD, AOx3, resting comfortably on room air   Abdomen:  Soft, non-tender, non-distended  Uterus:  Fundus firm below umbilicus  VE:  Expected lochia  Ext:  b/l LE non-tender                           11.0   x     )-----------( x        ( 02 Sep 2022 05:34 )             33.8

## 2022-09-03 NOTE — DISCHARGE NOTE OB - MEDICATION SUMMARY - MEDICATIONS TO TAKE
I will START or STAY ON the medications listed below when I get home from the hospital:    ibuprofen 600 mg oral tablet  -- 1 tab(s) by mouth every 6 hours, As Needed -for mild pain   -- Do not take this drug if you are pregnant.  It is very important that you take or use this exactly as directed.  Do not skip doses or discontinue unless directed by your doctor.  May cause drowsiness or dizziness.  Obtain medical advice before taking any non-prescription drugs as some may affect the action of this medication.  Take with food or milk.    -- Indication: For pain as needed    Tylenol 325 mg oral capsule  -- 3 cap(s) by mouth every 6 hours, As Needed -for mild pain   -- Indication: For pain as needed    MiraLax oral powder for reconstitution  -- 17 gram(s) by mouth once a day   -- Dilute this medication with liquid before administration.  It is very important that you take or use this exactly as directed.  Do not skip doses or discontinue unless directed by your doctor.    -- Indication: For constipation as needed

## 2022-09-03 NOTE — DISCHARGE NOTE OB - PATIENT PORTAL LINK FT
You can access the FollowMyHealth Patient Portal offered by [facility] by registering at the following website: [email or web address]. By joining [facility]’s FollowMyHealth portal, you will also be able to view your health information using other applications (apps) compatible with our system.

## 2022-09-03 NOTE — DISCHARGE NOTE OB - CARE PROVIDER_API CALL
PMD
Carole Kaye)  [facility]  [address], [address]  [place] [address]  Phone: [phone]  Fax: [phone]  Established Patient  Follow Up Time: 1 month

## 2022-09-03 NOTE — DISCHARGE NOTE OB - CARE PLAN
Principal Discharge DX:	Normal spontaneous vaginal delivery  Assessment and plan of treatment:	- Please call your provider to schedule a postpartum visit in 4-6 weeks. Contact information provided below.  - Prescriptions have been sent to pharmacy. Please take medications as directed.   - Patient is to continue with regular diet and activity as tolerated, nothing per vagina for 6 weeks, and exclusive breast feeding for the first 6 months is recommended.   - If you have additional concerns, or if you experience fevers > 100.4 F or heavy vaginal bleeding that is not decreasing, please inform your provider.   1

## 2022-09-06 ENCOUNTER — APPOINTMENT (OUTPATIENT)
Dept: ANTEPARTUM | Facility: CLINIC | Age: 32
End: 2022-09-06

## 2022-09-09 ENCOUNTER — APPOINTMENT (OUTPATIENT)
Dept: ANTEPARTUM | Facility: CLINIC | Age: 32
End: 2022-09-09

## 2022-09-12 LAB — SURGICAL PATHOLOGY STUDY: SIGNIFICANT CHANGE UP

## 2022-09-13 ENCOUNTER — APPOINTMENT (OUTPATIENT)
Dept: ANTEPARTUM | Facility: CLINIC | Age: 32
End: 2022-09-13

## 2022-09-16 ENCOUNTER — APPOINTMENT (OUTPATIENT)
Dept: ANTEPARTUM | Facility: CLINIC | Age: 32
End: 2022-09-16

## 2022-09-29 ENCOUNTER — NON-APPOINTMENT (OUTPATIENT)
Age: 32
End: 2022-09-29

## 2022-10-15 ENCOUNTER — EMERGENCY (EMERGENCY)
Facility: HOSPITAL | Age: 32
LOS: 1 days | Discharge: HOME | End: 2022-10-15
Attending: EMERGENCY MEDICINE
Payer: COMMERCIAL

## 2022-10-15 VITALS
OXYGEN SATURATION: 100 % | TEMPERATURE: 97 F | HEART RATE: 68 BPM | WEIGHT: 130.07 LBS | SYSTOLIC BLOOD PRESSURE: 148 MMHG | RESPIRATION RATE: 16 BRPM | HEIGHT: 63 IN | DIASTOLIC BLOOD PRESSURE: 77 MMHG

## 2022-10-15 LAB
ALBUMIN SERPL ELPH-MCNC: 4.4 G/DL — SIGNIFICANT CHANGE UP (ref 3.3–5.2)
ALP SERPL-CCNC: 168 U/L — HIGH (ref 40–120)
ALT FLD-CCNC: 41 U/L — HIGH
ANION GAP SERPL CALC-SCNC: 12 MMOL/L — SIGNIFICANT CHANGE UP (ref 5–17)
AST SERPL-CCNC: 89 U/L — HIGH
BASOPHILS # BLD AUTO: 0.06 K/UL — SIGNIFICANT CHANGE UP (ref 0–0.2)
BASOPHILS NFR BLD AUTO: 0.6 % — SIGNIFICANT CHANGE UP (ref 0–2)
BILIRUB SERPL-MCNC: 0.5 MG/DL — SIGNIFICANT CHANGE UP (ref 0.4–2)
BUN SERPL-MCNC: 9.8 MG/DL — SIGNIFICANT CHANGE UP (ref 8–20)
CALCIUM SERPL-MCNC: 9.4 MG/DL — SIGNIFICANT CHANGE UP (ref 8.4–10.5)
CHLORIDE SERPL-SCNC: 100 MMOL/L — SIGNIFICANT CHANGE UP (ref 98–107)
CK SERPL-CCNC: 82 U/L — SIGNIFICANT CHANGE UP (ref 25–170)
CO2 SERPL-SCNC: 29 MMOL/L — SIGNIFICANT CHANGE UP (ref 22–29)
CREAT SERPL-MCNC: 0.58 MG/DL — SIGNIFICANT CHANGE UP (ref 0.5–1.3)
D DIMER BLD IA.RAPID-MCNC: 192 NG/ML DDU — SIGNIFICANT CHANGE UP
EGFR: 123 ML/MIN/1.73M2 — SIGNIFICANT CHANGE UP
EOSINOPHIL # BLD AUTO: 0.05 K/UL — SIGNIFICANT CHANGE UP (ref 0–0.5)
EOSINOPHIL NFR BLD AUTO: 0.5 % — SIGNIFICANT CHANGE UP (ref 0–6)
GLUCOSE SERPL-MCNC: 94 MG/DL — SIGNIFICANT CHANGE UP (ref 70–99)
HCG SERPL-ACNC: <4 MIU/ML — SIGNIFICANT CHANGE UP
HCT VFR BLD CALC: 42.8 % — SIGNIFICANT CHANGE UP (ref 34.5–45)
HGB BLD-MCNC: 13.9 G/DL — SIGNIFICANT CHANGE UP (ref 11.5–15.5)
IMM GRANULOCYTES NFR BLD AUTO: 0.7 % — SIGNIFICANT CHANGE UP (ref 0–0.9)
LYMPHOCYTES # BLD AUTO: 1.86 K/UL — SIGNIFICANT CHANGE UP (ref 1–3.3)
LYMPHOCYTES # BLD AUTO: 19.9 % — SIGNIFICANT CHANGE UP (ref 13–44)
MCHC RBC-ENTMCNC: 29.4 PG — SIGNIFICANT CHANGE UP (ref 27–34)
MCHC RBC-ENTMCNC: 32.5 GM/DL — SIGNIFICANT CHANGE UP (ref 32–36)
MCV RBC AUTO: 90.5 FL — SIGNIFICANT CHANGE UP (ref 80–100)
MONOCYTES # BLD AUTO: 0.73 K/UL — SIGNIFICANT CHANGE UP (ref 0–0.9)
MONOCYTES NFR BLD AUTO: 7.8 % — SIGNIFICANT CHANGE UP (ref 2–14)
NEUTROPHILS # BLD AUTO: 6.58 K/UL — SIGNIFICANT CHANGE UP (ref 1.8–7.4)
NEUTROPHILS NFR BLD AUTO: 70.5 % — SIGNIFICANT CHANGE UP (ref 43–77)
PLATELET # BLD AUTO: 250 K/UL — SIGNIFICANT CHANGE UP (ref 150–400)
POTASSIUM SERPL-MCNC: 3.9 MMOL/L — SIGNIFICANT CHANGE UP (ref 3.5–5.3)
POTASSIUM SERPL-SCNC: 3.9 MMOL/L — SIGNIFICANT CHANGE UP (ref 3.5–5.3)
PROT SERPL-MCNC: 7.8 G/DL — SIGNIFICANT CHANGE UP (ref 6.6–8.7)
RBC # BLD: 4.73 M/UL — SIGNIFICANT CHANGE UP (ref 3.8–5.2)
RBC # FLD: 12.5 % — SIGNIFICANT CHANGE UP (ref 10.3–14.5)
SODIUM SERPL-SCNC: 141 MMOL/L — SIGNIFICANT CHANGE UP (ref 135–145)
TROPONIN T SERPL-MCNC: <0.01 NG/ML — SIGNIFICANT CHANGE UP (ref 0–0.06)
WBC # BLD: 9.35 K/UL — SIGNIFICANT CHANGE UP (ref 3.8–10.5)
WBC # FLD AUTO: 9.35 K/UL — SIGNIFICANT CHANGE UP (ref 3.8–10.5)

## 2022-10-15 PROCEDURE — 36415 COLL VENOUS BLD VENIPUNCTURE: CPT

## 2022-10-15 PROCEDURE — 84484 ASSAY OF TROPONIN QUANT: CPT

## 2022-10-15 PROCEDURE — 85379 FIBRIN DEGRADATION QUANT: CPT

## 2022-10-15 PROCEDURE — 93010 ELECTROCARDIOGRAM REPORT: CPT

## 2022-10-15 PROCEDURE — 80053 COMPREHEN METABOLIC PANEL: CPT

## 2022-10-15 PROCEDURE — 93005 ELECTROCARDIOGRAM TRACING: CPT

## 2022-10-15 PROCEDURE — 96361 HYDRATE IV INFUSION ADD-ON: CPT

## 2022-10-15 PROCEDURE — 84702 CHORIONIC GONADOTROPIN TEST: CPT

## 2022-10-15 PROCEDURE — 71046 X-RAY EXAM CHEST 2 VIEWS: CPT | Mod: 26

## 2022-10-15 PROCEDURE — 99285 EMERGENCY DEPT VISIT HI MDM: CPT

## 2022-10-15 PROCEDURE — 85025 COMPLETE CBC W/AUTO DIFF WBC: CPT

## 2022-10-15 PROCEDURE — 96374 THER/PROPH/DIAG INJ IV PUSH: CPT

## 2022-10-15 PROCEDURE — 82550 ASSAY OF CK (CPK): CPT

## 2022-10-15 PROCEDURE — 99285 EMERGENCY DEPT VISIT HI MDM: CPT | Mod: 25

## 2022-10-15 PROCEDURE — 71046 X-RAY EXAM CHEST 2 VIEWS: CPT

## 2022-10-15 RX ORDER — DIAZEPAM 5 MG
5 TABLET ORAL ONCE
Refills: 0 | Status: DISCONTINUED | OUTPATIENT
Start: 2022-10-15 | End: 2022-10-15

## 2022-10-15 RX ORDER — KETOROLAC TROMETHAMINE 30 MG/ML
15 SYRINGE (ML) INJECTION ONCE
Refills: 0 | Status: DISCONTINUED | OUTPATIENT
Start: 2022-10-15 | End: 2022-10-15

## 2022-10-15 RX ORDER — IBUPROFEN 200 MG
1 TABLET ORAL
Qty: 40 | Refills: 0
Start: 2022-10-15 | End: 2022-10-24

## 2022-10-15 RX ORDER — SODIUM CHLORIDE 9 MG/ML
1000 INJECTION INTRAMUSCULAR; INTRAVENOUS; SUBCUTANEOUS ONCE
Refills: 0 | Status: COMPLETED | OUTPATIENT
Start: 2022-10-15 | End: 2022-10-15

## 2022-10-15 RX ADMIN — Medication 15 MILLIGRAM(S): at 21:43

## 2022-10-15 RX ADMIN — Medication 5 MILLIGRAM(S): at 21:43

## 2022-10-15 RX ADMIN — SODIUM CHLORIDE 1000 MILLILITER(S): 9 INJECTION INTRAMUSCULAR; INTRAVENOUS; SUBCUTANEOUS at 21:43

## 2022-10-15 NOTE — ED PROVIDER NOTE - OBJECTIVE STATEMENT
33 y/o F with h/o epilepsy p/w back pain for 1 month and it radiates to chest by wrapping around it as a spasm intermittently. Pt has this pain since she gave childbirth 1 month ago. Pt is breast feeding. No fever, chills, cough, seizure or syncope

## 2022-10-15 NOTE — ED ADULT NURSE NOTE - OBJECTIVE STATEMENT
pt A&Ox3 states she gave birth to her child sep 1st and since then she has been having back pain which began to radiate to her chest and n/v that started today. pt does endorse feeling dizzy and states whenever she attempts to eat or drink anything she vomits. pt updated on plan of care awaiting xray and lab results.

## 2022-10-15 NOTE — ED ADULT TRIAGE NOTE - CHIEF COMPLAINT QUOTE
Ambulatory reporting chest pain that started approx 1800 with associated N/V. PMH of epilepsy on Lamictal. EKG in progress.

## 2022-10-15 NOTE — ED PROVIDER NOTE - PATIENT PORTAL LINK FT
You can access the FollowMyHealth Patient Portal offered by [facility] by registering at the following website: [email or web address]. By joining [facility]’s FollowMyHealth portal, you will also be able to view your health information using other applications (apps) compatible with our system.

## 2022-10-15 NOTE — ED PROVIDER NOTE - PRINCIPAL DIAGNOSIS
Manual Repair Warning Statement: We plan on removing the manually selected variable below in favor of our much easier automatic structured text blocks found in the previous tab. We decided to do this to help make the flow better and give you the full power of structured data. Manual selection is never going to be ideal in our platform and I would encourage you to avoid using manual selection from this point on, especially since I will be sunsetting this feature. It is important that you do one of two things with the customized text below. First, you can save all of the text in a word file so you can have it for future reference. Second, transfer the text to the appropriate area in the Library tab. Lastly, if there is a flap or graft type which we do not have you need to let us know right away so I can add it in before the variable is hidden. No need to panic, we plan to give you roughly 6 months to make the change. Chest pain

## 2022-10-15 NOTE — ED PROVIDER NOTE - NSFOLLOWUPINSTRUCTIONS_ED_ALL_ED_FT
Ibuprofen 600 mg every 6 hrs with food as needed for pain  Follow up with your doctor next 2-3 days  Return sooner for any problems    Chest Pain    Chest pain can be caused by many different conditions which may or may not be dangerous. Causes include heartburn, lung infections, heart attack, blood clot in lungs, skin infections, strain or damage to muscle, cartilage, or bones, etc. In addition to a history and physical examination, an electrocardiogram (ECG) or other lab tests may have been performed to determine the cause of your chest pain. Follow up with your primary care provider or with a cardiologist as instructed.     SEEK IMMEDIATE MEDICAL CARE IF YOU HAVE ANY OF THE FOLLOWING SYMPTOMS: worsening chest pain, coughing up blood, unexplained back/neck/jaw pain, severe abdominal pain, dizziness or lightheadedness, fainting, shortness of breath, sweaty or clammy skin, vomiting, or racing heart beat. These symptoms may represent a serious problem that is an emergency. Do not wait to see if the symptoms will go away. Get medical help right away. Call 911 and do not drive yourself to the hospital.

## 2022-10-15 NOTE — ED ADULT TRIAGE NOTE - ACCOMPANIED BY
Self Isolation, tylenol prn, o2 support. cxr reviewed by me does not appear to have typical cxr findings but is covid-19 positive, will keep on dexamethasone as pt. requiring o2, will request ID consult if candidate for Remdesivir. Isolation, tylenol prn, o2 support. cxr reviewed by me does not appear to have typical cxr findings but is covid-19 positive, will keep on dexamethasone as pt. requiring o2, will request ID consult if candidate for Remdesivir. blood cx sent as had fever 101.7 but afebrile later, likely covid-19 related, will hold antibiotics for now.

## 2022-10-16 PROBLEM — G40.909 EPILEPSY, UNSPECIFIED, NOT INTRACTABLE, WITHOUT STATUS EPILEPTICUS: Chronic | Status: ACTIVE | Noted: 2022-08-31

## 2022-12-21 NOTE — OB RN PATIENT PROFILE - TEACHING/LEARNING FACTORS IMPACT ABILITY TO LEARN
PT CALLED STATING HER INSURANCE WILL COVER A DEXCOM AND USPPLIES W/ A PA. SHE REQUESTED AN RX TO BE SENT I TO [facility] IN [place], [place].    none

## 2022-12-31 NOTE — OB RN DELIVERY SUMMARY - NS_RESUSCITEFFORT_OBGYN_ALL_OB
Mother reports lethargy weight loss of 6 lbs and increased thirst   Bulb Tere-Pharynx Suction with additional procedures

## 2023-01-08 NOTE — ED PROVIDER NOTE - INTERNATIONAL TRAVEL
January 8, 2023     Patient: Ayaan Ying   YOB: 1986   Date of Visit: 1/8/2023       To Whom It May Concern: It is my medical opinion that Ayaan Ying not attend work 01/08/2023  If you have any questions or concerns, please don't hesitate to call           Sincerely,        Sarah Redman PA-C    CC: No Recipients
No

## 2024-07-29 NOTE — DISCHARGE NOTE OB - CHANGE SANITARY PADS FREQUENTLY.  WASHING AND WIPING SHOULD OCCUR FROM FRONT TO BACK
Statement Selected
to address impairments in transfers, balance, gait, endurance, and posture/Home PT
